# Patient Record
Sex: FEMALE | Race: OTHER | NOT HISPANIC OR LATINO | URBAN - METROPOLITAN AREA
[De-identification: names, ages, dates, MRNs, and addresses within clinical notes are randomized per-mention and may not be internally consistent; named-entity substitution may affect disease eponyms.]

---

## 2021-03-19 ENCOUNTER — EMERGENCY (EMERGENCY)
Facility: HOSPITAL | Age: 71
LOS: 1 days | Discharge: HOME | End: 2021-03-19
Attending: EMERGENCY MEDICINE | Admitting: EMERGENCY MEDICINE
Payer: COMMERCIAL

## 2021-03-19 VITALS
SYSTOLIC BLOOD PRESSURE: 168 MMHG | DIASTOLIC BLOOD PRESSURE: 94 MMHG | RESPIRATION RATE: 17 BRPM | HEART RATE: 80 BPM | OXYGEN SATURATION: 98 % | TEMPERATURE: 98 F

## 2021-03-19 VITALS
OXYGEN SATURATION: 99 % | TEMPERATURE: 98 F | DIASTOLIC BLOOD PRESSURE: 82 MMHG | SYSTOLIC BLOOD PRESSURE: 142 MMHG | RESPIRATION RATE: 18 BRPM | HEART RATE: 60 BPM

## 2021-03-19 DIAGNOSIS — I10 ESSENTIAL (PRIMARY) HYPERTENSION: ICD-10-CM

## 2021-03-19 DIAGNOSIS — Z20.822 CONTACT WITH AND (SUSPECTED) EXPOSURE TO COVID-19: ICD-10-CM

## 2021-03-19 DIAGNOSIS — R53.83 OTHER FATIGUE: ICD-10-CM

## 2021-03-19 DIAGNOSIS — Z88.0 ALLERGY STATUS TO PENICILLIN: ICD-10-CM

## 2021-03-19 DIAGNOSIS — E78.5 HYPERLIPIDEMIA, UNSPECIFIED: ICD-10-CM

## 2021-03-19 DIAGNOSIS — E11.9 TYPE 2 DIABETES MELLITUS WITHOUT COMPLICATIONS: ICD-10-CM

## 2021-03-19 DIAGNOSIS — R00.2 PALPITATIONS: ICD-10-CM

## 2021-03-19 LAB
ALBUMIN SERPL ELPH-MCNC: 4.6 G/DL — SIGNIFICANT CHANGE UP (ref 3.3–5)
ALP SERPL-CCNC: 87 U/L — SIGNIFICANT CHANGE UP (ref 40–120)
ALT FLD-CCNC: 23 U/L — SIGNIFICANT CHANGE UP (ref 10–45)
ANION GAP SERPL CALC-SCNC: 12 MMOL/L — SIGNIFICANT CHANGE UP (ref 5–17)
APTT BLD: 34.4 SEC — SIGNIFICANT CHANGE UP (ref 27.5–35.5)
AST SERPL-CCNC: 22 U/L — SIGNIFICANT CHANGE UP (ref 10–40)
BASOPHILS # BLD AUTO: 0.06 K/UL — SIGNIFICANT CHANGE UP (ref 0–0.2)
BASOPHILS NFR BLD AUTO: 0.7 % — SIGNIFICANT CHANGE UP (ref 0–2)
BILIRUB SERPL-MCNC: 0.3 MG/DL — SIGNIFICANT CHANGE UP (ref 0.2–1.2)
BUN SERPL-MCNC: 19 MG/DL — SIGNIFICANT CHANGE UP (ref 7–23)
CALCIUM SERPL-MCNC: 9.4 MG/DL — SIGNIFICANT CHANGE UP (ref 8.4–10.5)
CHLORIDE SERPL-SCNC: 104 MMOL/L — SIGNIFICANT CHANGE UP (ref 96–108)
CO2 SERPL-SCNC: 25 MMOL/L — SIGNIFICANT CHANGE UP (ref 22–31)
CREAT SERPL-MCNC: 0.6 MG/DL — SIGNIFICANT CHANGE UP (ref 0.5–1.3)
EOSINOPHIL # BLD AUTO: 0.35 K/UL — SIGNIFICANT CHANGE UP (ref 0–0.5)
EOSINOPHIL NFR BLD AUTO: 4 % — SIGNIFICANT CHANGE UP (ref 0–6)
GLUCOSE SERPL-MCNC: 102 MG/DL — HIGH (ref 70–99)
HCT VFR BLD CALC: 40.6 % — SIGNIFICANT CHANGE UP (ref 34.5–45)
HGB BLD-MCNC: 13.2 G/DL — SIGNIFICANT CHANGE UP (ref 11.5–15.5)
IMM GRANULOCYTES NFR BLD AUTO: 0.2 % — SIGNIFICANT CHANGE UP (ref 0–1.5)
INR BLD: 1.09 — SIGNIFICANT CHANGE UP (ref 0.88–1.16)
LYMPHOCYTES # BLD AUTO: 3.15 K/UL — SIGNIFICANT CHANGE UP (ref 1–3.3)
LYMPHOCYTES # BLD AUTO: 36.3 % — SIGNIFICANT CHANGE UP (ref 13–44)
MAGNESIUM SERPL-MCNC: 2.6 MG/DL — SIGNIFICANT CHANGE UP (ref 1.6–2.6)
MCHC RBC-ENTMCNC: 27.2 PG — SIGNIFICANT CHANGE UP (ref 27–34)
MCHC RBC-ENTMCNC: 32.5 GM/DL — SIGNIFICANT CHANGE UP (ref 32–36)
MCV RBC AUTO: 83.5 FL — SIGNIFICANT CHANGE UP (ref 80–100)
MONOCYTES # BLD AUTO: 0.77 K/UL — SIGNIFICANT CHANGE UP (ref 0–0.9)
MONOCYTES NFR BLD AUTO: 8.9 % — SIGNIFICANT CHANGE UP (ref 2–14)
NEUTROPHILS # BLD AUTO: 4.32 K/UL — SIGNIFICANT CHANGE UP (ref 1.8–7.4)
NEUTROPHILS NFR BLD AUTO: 49.9 % — SIGNIFICANT CHANGE UP (ref 43–77)
NRBC # BLD: 0 /100 WBCS — SIGNIFICANT CHANGE UP (ref 0–0)
PLATELET # BLD AUTO: 304 K/UL — SIGNIFICANT CHANGE UP (ref 150–400)
POTASSIUM SERPL-MCNC: 3.8 MMOL/L — SIGNIFICANT CHANGE UP (ref 3.5–5.3)
POTASSIUM SERPL-SCNC: 3.8 MMOL/L — SIGNIFICANT CHANGE UP (ref 3.5–5.3)
PROT SERPL-MCNC: 7.9 G/DL — SIGNIFICANT CHANGE UP (ref 6–8.3)
PROTHROM AB SERPL-ACNC: 13 SEC — SIGNIFICANT CHANGE UP (ref 10.6–13.6)
RBC # BLD: 4.86 M/UL — SIGNIFICANT CHANGE UP (ref 3.8–5.2)
RBC # FLD: 14.2 % — SIGNIFICANT CHANGE UP (ref 10.3–14.5)
SARS-COV-2 RNA SPEC QL NAA+PROBE: SIGNIFICANT CHANGE UP
SODIUM SERPL-SCNC: 141 MMOL/L — SIGNIFICANT CHANGE UP (ref 135–145)
TROPONIN T SERPL-MCNC: 0.01 NG/ML — SIGNIFICANT CHANGE UP (ref 0–0.01)
WBC # BLD: 8.67 K/UL — SIGNIFICANT CHANGE UP (ref 3.8–10.5)
WBC # FLD AUTO: 8.67 K/UL — SIGNIFICANT CHANGE UP (ref 3.8–10.5)

## 2021-03-19 PROCEDURE — 80053 COMPREHEN METABOLIC PANEL: CPT

## 2021-03-19 PROCEDURE — G1004: CPT

## 2021-03-19 PROCEDURE — U0003: CPT

## 2021-03-19 PROCEDURE — 71275 CT ANGIOGRAPHY CHEST: CPT

## 2021-03-19 PROCEDURE — 93010 ELECTROCARDIOGRAM REPORT: CPT | Mod: 59

## 2021-03-19 PROCEDURE — 99285 EMERGENCY DEPT VISIT HI MDM: CPT

## 2021-03-19 PROCEDURE — 83735 ASSAY OF MAGNESIUM: CPT

## 2021-03-19 PROCEDURE — 93005 ELECTROCARDIOGRAM TRACING: CPT

## 2021-03-19 PROCEDURE — 75574 CT ANGIO HRT W/3D IMAGE: CPT | Mod: 26,MG

## 2021-03-19 PROCEDURE — 75574 CT ANGIO HRT W/3D IMAGE: CPT

## 2021-03-19 PROCEDURE — 36415 COLL VENOUS BLD VENIPUNCTURE: CPT

## 2021-03-19 PROCEDURE — 85610 PROTHROMBIN TIME: CPT

## 2021-03-19 PROCEDURE — 84484 ASSAY OF TROPONIN QUANT: CPT

## 2021-03-19 PROCEDURE — U0005: CPT

## 2021-03-19 PROCEDURE — 71275 CT ANGIOGRAPHY CHEST: CPT | Mod: 26,MG

## 2021-03-19 PROCEDURE — 85730 THROMBOPLASTIN TIME PARTIAL: CPT

## 2021-03-19 PROCEDURE — 99284 EMERGENCY DEPT VISIT MOD MDM: CPT | Mod: 25

## 2021-03-19 PROCEDURE — 85025 COMPLETE CBC W/AUTO DIFF WBC: CPT

## 2021-03-19 NOTE — ED PROVIDER NOTE - NSFOLLOWUPINSTRUCTIONS_ED_ALL_ED_FT
Stay well hydrated.  Return for fevers, persistent vomit, uncontrolled pain, worsening breathing, worsening lightheaded.  Follow up with Dr. Morrison and Dr. Rocha. Call your doctor to schedule appointment.     Palpitations    A palpitation is the feeling that your heartbeat is irregular or is faster than normal. It may feel like your heart is fluttering or skipping a beat. They may be caused by many things, including smoking, caffeine, alcohol, stress, and certain medicines. Although most causes of palpitations are not serious, palpitations can be a sign of a serious medical problem. Avoid caffeine, alcohol, and tobacco products at home. Try to reduce stress and anxiety and make sure to get plenty of rest.     SEEK IMMEDIATE MEDICAL CARE IF YOU HAVE ANY OF THE FOLLOWING SYMPTOMS: chest pain, shortness of breath, severe headache, dizziness/lightheadedness, or fainting.

## 2021-03-19 NOTE — ED ADULT TRIAGE NOTE - CHIEF COMPLAINT QUOTE
pt c/o intermittent palpitations since february, worse yesterday and today. denies cp, sob. pt s/w her md and was told to go to ed for eval.

## 2021-03-19 NOTE — ED ADULT NURSE NOTE - OBJECTIVE STATEMENT
71yo female co intermittent palpitations while performing ADLs lasting about thirty minutes since February. Denies CP, SOB, N/V/D, headache, numbness/tingling, fevers/chills. Pt states she measured her HR with a monitor at home on initial episode and HR was in 120s. Endorses hx of htn.

## 2021-03-19 NOTE — ED PROVIDER NOTE - PATIENT PORTAL LINK FT
You can access the FollowMyHealth Patient Portal offered by [facility] by registering at the following website: [email or web address]. By joining [facility]’s FollowMyHealth portal, you will also be able to view your health information using other applications (apps) compatible with our system.

## 2021-03-19 NOTE — ED PROVIDER NOTE - CLINICAL SUMMARY MEDICAL DECISION MAKING FREE TEXT BOX
70F PMH borderline DM, HTN, HLD, p/w palpitations. Has ~1mo of sensation of heart beating fast, lasts ~30min. Increasing episodes. Called PMD Dr. Finn last night who referred to ED. Pt had episode this morning but is currently completely asymptomatic. During these episodes pt feels like her "brain is frozen," as well as b/l UE "tiredness." Mild HTN, other vitals wnl. Exam as above.   ddx: Palpitations x1mo. Low suspicion for ACS or significant arrythmia   labs, COVID, reassess.

## 2021-03-19 NOTE — ED PROVIDER NOTE - PROGRESS NOTE DETAILS
[place]: labs grossly wnl. remains asymptomatic. I d/w pmd dr. elise [phone]. He had touched base w/ dr. fontanez. I d/w dr. fontanez. requesting CT coronaries/PE eval. Updated pt. [place]: CTs w/ no acute pathology. Pt remains asymptomatic. Clinically no indication for further emergent ED workup or hospitalization at this time. Comfortable for dc, outpt f/u.  Updated dr. estee Easton will coordinate w/ dr. bailey outpt f/u.

## 2021-03-19 NOTE — ED PROVIDER NOTE - OBJECTIVE STATEMENT
70F PMH borderline DM, HTN, HLD, p/w palpitations. Has ~1mo of sensation of heart beating fast, lasts ~30min. Increasing episodes. Called PMD Dr. Finn last night who referred to ED. Pt had episode this morning but is currently completely asymptomatic. During these episodes pt feels like her "brain is frozen," as well as b/l UE "tiredness." Pt states that she cjecked her HR during one of these episodes and was 127  Denies associated CP, SOB, NVD, lightheaded, diaphoresis, cough/rhinorrhea, black/bloody stool, LE pain/swelling, other focal weakness/numbness, recent travel/immobilization, abd pain, urinary complaints, f/c. No hormone use. No FMH CAD/clots/sudden death. No known prior cardiac w/u. 70F PMH borderline DM, HTN, HLD, p/w palpitations. Has ~1mo of sensation of heart beating fast, lasts ~30min. Increasing episodes. Called PMD Dr. Finn last night who referred to ED. Pt had episode this morning but is currently completely asymptomatic. During these episodes pt feels like her "brain is frozen," as well as b/l UE "tiredness." Pt states that she checked her HR during one of these episodes and was 127  Denies associated CP, SOB, NVD, lightheaded, diaphoresis, cough/rhinorrhea, black/bloody stool, LE pain/swelling, other focal weakness/numbness, recent travel/immobilization, abd pain, urinary complaints, f/c. No hormone use. No FMH CAD/clots/sudden death. No known prior cardiac w/u.

## 2021-04-28 ENCOUNTER — NON-APPOINTMENT (OUTPATIENT)
Age: 71
End: 2021-04-28

## 2021-04-28 ENCOUNTER — APPOINTMENT (OUTPATIENT)
Dept: HEART AND VASCULAR | Facility: CLINIC | Age: 71
End: 2021-04-28
Payer: MEDICARE

## 2021-04-28 VITALS
TEMPERATURE: 97.4 F | WEIGHT: 149 LBS | BODY MASS INDEX: 30.04 KG/M2 | DIASTOLIC BLOOD PRESSURE: 79 MMHG | HEIGHT: 59 IN | HEART RATE: 85 BPM | SYSTOLIC BLOOD PRESSURE: 163 MMHG

## 2021-04-28 DIAGNOSIS — I10 ESSENTIAL (PRIMARY) HYPERTENSION: ICD-10-CM

## 2021-04-28 DIAGNOSIS — Z78.9 OTHER SPECIFIED HEALTH STATUS: ICD-10-CM

## 2021-04-28 DIAGNOSIS — I48.0 PAROXYSMAL ATRIAL FIBRILLATION: ICD-10-CM

## 2021-04-28 DIAGNOSIS — E78.5 HYPERLIPIDEMIA, UNSPECIFIED: ICD-10-CM

## 2021-04-28 PROCEDURE — 99205 OFFICE O/P NEW HI 60 MIN: CPT | Mod: 25

## 2021-04-28 PROCEDURE — 99072 ADDL SUPL MATRL&STAF TM PHE: CPT

## 2021-04-28 PROCEDURE — 93000 ELECTROCARDIOGRAM COMPLETE: CPT

## 2021-04-28 RX ORDER — LOSARTAN POTASSIUM 25 MG/1
25 TABLET, FILM COATED ORAL
Refills: 0 | Status: ACTIVE | COMMUNITY
Start: 2021-04-28

## 2021-04-28 RX ORDER — SIMVASTATIN 20 MG/1
20 TABLET, FILM COATED ORAL
Refills: 0 | Status: ACTIVE | COMMUNITY
Start: 2021-04-28

## 2021-05-03 NOTE — PHYSICAL EXAM
[Well Developed] : well developed [Well Nourished] : well nourished [Normal Conjunctiva] : normal conjunctiva [Normal] : normal venous pressure, no carotid bruit [Normal S1, S2] : normal S1, S2 [Clear Lung Fields] : clear lung fields [Good Air Entry] : good air entry [No Respiratory Distress] : no respiratory distress  [Soft] : abdomen soft [Normal Gait] : normal gait [No Edema] : no edema [No Rash] : no rash [Moves all extremities] : moves all extremities [Alert and Oriented] : alert and oriented

## 2021-05-03 NOTE — HISTORY OF PRESENT ILLNESS
[FreeTextEntry1] : 70 year old female with HTN, HLD and symptomatic paroxysmal atrial fibrillation, who presents for initial evaluation.\par \par She states that she has been having episodes of palpitations since 2018.  Initially she ignored them because she thought they were not important,  Now they occur about twice a month.  She wore an event monitor with afib rates up to 170 - no overall burden on monitor.  She is not on oral anticoagulation.  She notes palpitations but no chest pain, SOB, syncope, near syncope, edema.  \par

## 2021-05-03 NOTE — REVIEW OF SYSTEMS
[Palpitations] : palpitations [Negative] : Heme/Lymph [Fever] : no fever [Chills] : no chills [Feeling Fatigued] : not feeling fatigued [SOB] : no shortness of breath [Syncope] : no syncope

## 2021-05-03 NOTE — DISCUSSION/SUMMARY
[FreeTextEntry1] : 70 year old female with HTN, HLD and symptomatic paroxysmal atrial fibrillation, who presents for initial evaluation.  We discussed what atrial fibrillation is, the natural progression of this arrhythmia and the association with stroke.  Her CHADSVASC score is at least 3 and I have asked her to start ELiquis.  WE discussed treatment options for paroxysmal atrial fibrillation including rate control / observation, antiarrhythmic medications or an ablation.  We discussed the procedure in detail including risks, benefits and alternatives.  She would like to think about this before deciding how she wants to proceed.  I have asked her to get an echocardiogram to assure she ha a structurally normal heart and a CT scan to define her anatomy incase she decides to proceed with an ablation. I have also asked her to start low dose Metoprolol to see if this helps with her symptoms.   She will follow up after these tests or sooner if needed and knows to call with any questions or concerns.

## 2021-05-26 ENCOUNTER — FORM ENCOUNTER (OUTPATIENT)
Age: 71
End: 2021-05-26

## 2021-05-27 ENCOUNTER — APPOINTMENT (OUTPATIENT)
Dept: CT IMAGING | Facility: HOSPITAL | Age: 71
End: 2021-05-27

## 2021-05-27 ENCOUNTER — OUTPATIENT (OUTPATIENT)
Dept: OUTPATIENT SERVICES | Facility: HOSPITAL | Age: 71
LOS: 1 days | End: 2021-05-27
Payer: MEDICARE

## 2021-05-27 DIAGNOSIS — I48.0 PAROXYSMAL ATRIAL FIBRILLATION: ICD-10-CM

## 2021-05-27 PROCEDURE — 93306 TTE W/DOPPLER COMPLETE: CPT

## 2021-05-27 PROCEDURE — 76376 3D RENDER W/INTRP POSTPROCES: CPT | Mod: 26

## 2021-05-27 PROCEDURE — 93306 TTE W/DOPPLER COMPLETE: CPT | Mod: 26

## 2021-06-10 ENCOUNTER — NON-APPOINTMENT (OUTPATIENT)
Age: 71
End: 2021-06-10

## 2021-06-10 ENCOUNTER — APPOINTMENT (OUTPATIENT)
Dept: HEART AND VASCULAR | Facility: CLINIC | Age: 71
End: 2021-06-10
Payer: MEDICARE

## 2021-06-10 VITALS
OXYGEN SATURATION: 96 % | WEIGHT: 151 LBS | SYSTOLIC BLOOD PRESSURE: 129 MMHG | HEART RATE: 78 BPM | DIASTOLIC BLOOD PRESSURE: 76 MMHG | TEMPERATURE: 97.2 F | HEIGHT: 59 IN | BODY MASS INDEX: 30.44 KG/M2

## 2021-06-10 PROCEDURE — 93000 ELECTROCARDIOGRAM COMPLETE: CPT

## 2021-06-10 PROCEDURE — 99072 ADDL SUPL MATRL&STAF TM PHE: CPT

## 2021-06-10 PROCEDURE — 99214 OFFICE O/P EST MOD 30 MIN: CPT

## 2021-06-14 NOTE — REVIEW OF SYSTEMS
[Palpitations] : palpitations [Negative] : Heme/Lymph [Fever] : no fever [Weight Gain (___ Lbs)] : no recent weight gain [Chills] : no chills [Feeling Fatigued] : not feeling fatigued [Weight Loss (___ Lbs)] : no recent weight loss [SOB] : no shortness of breath [Syncope] : no syncope [Rash] : no rash

## 2021-06-14 NOTE — DISCUSSION/SUMMARY
[FreeTextEntry1] : 70 year old female with HTN, HLD and symptomatic paroxysmal atrial fibrillation, who presents for follow up.  We again discussed what atrial fibrillation is, the natural progression of this arrhythmia and the association with stroke.  Her CHADSVASC score is at least 3 and she notes compliance with Eliquis.  She notes continued symptoms from her paroxysmal afib.  Structurally normal heart on echo.  We again discussed options of rate control / observation, antiarrhythmic medications or an ablation.  We discussed the procedure in detail including risks, benefits and alternatives.  She did not note am improvement with starting Metoprolol.  I have told her she is a good candidate for an ablation - however, she is not ready to proceed.  She will follow up with her primary cardiologist to discuss this further and knows to call with any questions or concerns.

## 2021-06-14 NOTE — HISTORY OF PRESENT ILLNESS
[FreeTextEntry1] : 70 year old female with HTN, HLD and symptomatic paroxysmal atrial fibrillation, who presents for follow up.\par \par She states that she has been having episodes of palpitations since 2018.  Initially she ignored them because she thought they were not important,  Now they occur about twice a month.  She wore an event monitor with afib rates up to 170 - no overall burden on monitor.  She was seen in our office and started on oral anticoagulation.  She continues to note palpitations but no chest pain, SOB, syncope, near syncope, edema.  \par

## 2021-06-14 NOTE — CARDIOLOGY SUMMARY
[de-identified] : 4/28/21 sinus at 77bpm\par 6/10/21 sinus at 80bpm [de-identified] : 1. Normal left and right ventricular size and systolic function. \par  2. Indeterminate left ventricular diastolic function. \par  3. No significant valvular disease. \par  4. No evidence of pulmonary hypertension, pulmonary artery systolic pressure is 18 m\par  5. Trivial pericardial effusion. \par  6. No prior echo is available for comparison.

## 2021-06-14 NOTE — PHYSICAL EXAM
[Well Developed] : well developed [Well Nourished] : well nourished [Normal Conjunctiva] : normal conjunctiva [Normal] : normal venous pressure, no carotid bruit [Normal S1, S2] : normal S1, S2 [Clear Lung Fields] : clear lung fields [Good Air Entry] : good air entry [No Respiratory Distress] : no respiratory distress  [Soft] : abdomen soft [Normal Gait] : normal gait [No Edema] : no edema [No Rash] : no rash [Moves all extremities] : moves all extremities [Alert and Oriented] : alert and oriented [No Acute Distress] : no acute distress [No Murmur] : no murmur

## 2021-10-04 DIAGNOSIS — I67.1 CEREBRAL ANEURYSM, NONRUPTURED: ICD-10-CM

## 2021-10-05 ENCOUNTER — APPOINTMENT (OUTPATIENT)
Dept: NEUROSURGERY | Facility: CLINIC | Age: 71
End: 2021-10-05
Payer: MEDICARE

## 2021-10-05 VITALS
SYSTOLIC BLOOD PRESSURE: 148 MMHG | BODY MASS INDEX: 30.44 KG/M2 | OXYGEN SATURATION: 98 % | TEMPERATURE: 97 F | WEIGHT: 151 LBS | RESPIRATION RATE: 18 BRPM | HEIGHT: 59 IN | HEART RATE: 71 BPM | DIASTOLIC BLOOD PRESSURE: 71 MMHG

## 2021-10-05 PROCEDURE — 99205 OFFICE O/P NEW HI 60 MIN: CPT

## 2021-10-06 NOTE — ASSESSMENT
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 I explained the indications for observation and imaging surveillance, medical management endovascular therapies and surgery. I discussed the risks, benefits, possible complications and expected outcome related to each treatment option. In the end, I recommend repeating MRA brain to check progression of her A2 segment aneurysm and will have her follow up in the office in 2 weeks time for imaging review and possible need for further workup. We discussed possible referral to neurology on follow up for her episodes of numbness and heaviness of head, arms and legs.  The patient understands the plan of care and is in agreement.  All questions answered to patient satisfaction.\par \par

## 2021-10-06 NOTE — PHYSICAL EXAM
[General Appearance - Alert] : alert [General Appearance - In No Acute Distress] : in no acute distress [Oriented To Time, Place, And Person] : oriented to person, place, and time [Impaired Insight] : insight and judgment were intact [Affect] : the affect was normal [Person] : oriented to person [Place] : oriented to place [Time] : oriented to time [Short Term Intact] : short term memory intact [Remote Intact] : remote memory intact [Span Intact] : the attention span was normal [Concentration Intact] : normal concentrating ability [Fluency] : fluency intact [Comprehension] : comprehension intact [Current Events] : adequate knowledge of current events [Past History] : adequate knowledge of personal past history [Vocabulary] : adequate range of vocabulary [Cranial Nerves Optic (II)] : visual acuity intact bilaterally,  pupils equal round and reactive to light [Cranial Nerves Oculomotor (III)] : extraocular motion intact [Cranial Nerves Trigeminal (V)] : facial sensation intact symmetrically [Cranial Nerves Facial (VII)] : face symmetrical [Cranial Nerves Vestibulocochlear (VIII)] : hearing was intact bilaterally [Cranial Nerves Glossopharyngeal (IX)] : tongue and palate midline [Cranial Nerves Accessory (XI - Cranial And Spinal)] : head turning and shoulder shrug symmetric [Cranial Nerves Hypoglossal (XII)] : there was no tongue deviation with protrusion [Motor Tone] : muscle tone was normal in all four extremities [Motor Strength] : muscle strength was normal in all four extremities [No Muscle Atrophy] : normal bulk in all four extremities [Abnormal Walk] : normal gait [Sensation Tactile Decrease] : light touch was intact [Balance] : balance was intact [2+] : Patella left 2+ [Past-pointing] : there was no past-pointing [Tremor] : no tremor present

## 2021-10-06 NOTE — HISTORY OF PRESENT ILLNESS
[de-identified] : ALEJANDRO SAUCEDA is a 70 year female with a PMH of HTN, HLD, Paroxysmal Afib diagnosed in February, currently on eliquis, who presents to the office today for neuroendovascular consultation due to 2mm A2 segment of KRISTA aneurysm last imaged in 2018.  The patient complains of 2-3 episodes per week of 30-45 mins of lightheadedness, numbness of the entire head and tingling/heaviness of bilateral arms. Sometimes she has nausea associated with these episodes. The patient reports no current headaches, nausea, vomiting, weakness, numbness, or visual changes.  She has never smoked. and has no family history of aneurysms.  \par

## 2021-11-27 PROBLEM — R20.0 NUMBNESS OF ARM: Status: ACTIVE | Noted: 2021-11-27

## 2021-11-30 ENCOUNTER — APPOINTMENT (OUTPATIENT)
Dept: NEUROSURGERY | Facility: CLINIC | Age: 71
End: 2021-11-30
Payer: MEDICARE

## 2021-11-30 DIAGNOSIS — R20.0 ANESTHESIA OF SKIN: ICD-10-CM

## 2021-12-07 ENCOUNTER — APPOINTMENT (OUTPATIENT)
Dept: NEUROSURGERY | Facility: CLINIC | Age: 71
End: 2021-12-07
Payer: MEDICARE

## 2022-01-04 ENCOUNTER — APPOINTMENT (OUTPATIENT)
Dept: NEUROSURGERY | Facility: CLINIC | Age: 72
End: 2022-01-04
Payer: MEDICARE

## 2022-01-06 DIAGNOSIS — Z01.818 ENCOUNTER FOR OTHER PREPROCEDURAL EXAMINATION: ICD-10-CM

## 2022-01-07 ENCOUNTER — APPOINTMENT (OUTPATIENT)
Dept: NEUROSURGERY | Facility: CLINIC | Age: 72
End: 2022-01-07
Payer: MEDICARE

## 2022-01-07 PROCEDURE — 99215 OFFICE O/P EST HI 40 MIN: CPT | Mod: 95

## 2022-01-14 NOTE — REASON FOR VISIT
[FreeTextEntry1] : The patient has given verbal consent for this telehealth visit using two-way audio and video technology.  The patient is currently located at home [address]\[place] [address], and I am located at my office at [facility].\par \par \par Ms. Sauceda returns today for interval followup and imaging review of MRA brain, which was performed to evaluate her known 2mm KRISTA A2 segment unruptured, untreated cerebral aneurysm.  The patient reports no current headaches, nausea, vomiting, weakness, numbness, or visual changes.\par \par ALEJANDRO SAUCEDA is a 70 year female with a PMH of HTN, HLD, Paroxysmal Afib diagnosed in February, currently on eliquis, who presents to the office today for neuroendovascular consultation due to 2mm A2 segment of KRISTA aneurysm last imaged in 2018. The patient complains of 2-3 episodes per week of 30-45 mins of lightheadedness, numbness of the entire head and tingling/heaviness of bilateral arms. Sometimes she has nausea associated with these episodes. The patient reports no current headaches, nausea, vomiting, weakness, numbness, or visual changes. She has never smoked. and has no family history of aneurysms.

## 2022-01-14 NOTE — ASSESSMENT
[FreeTextEntry1] : Ms. Fields is a 70 year old with HTN, HLD, paroxysmal afib on eliquis who presents for follow up of her known right A2 segment 2mm aneurysm, last imaged in 2018.\par \par Ms. Fields's KRISTA aneurysm has grown slightly in size and morphology is slightly changed on her recent MRA as compared with her last imaging from 2018. I have discussed the natural history and treatment options for KRISTA aneurysms with the patient. I explained the indications for observation and imaging surveillance, medical management endovascular therapies and surgery. I discussed the risks, benefits, possible complications and expected outcome related to each treatment option. In the end,  I advised that she undergo a diagnostic cerebral angiogram to further evaluate her aneurysm.  I alternatively discussed that she could do follow up imaging in the form of MRA brain in 6 month's time to assess for further change.  In the end, she has decided to think about her options and we touch base with her next week once she has made a decision.  We discussed possible referral to neurology on follow up for her episodes of numbness and heaviness of head, arms and legs. The patient understands the plan of care and is in agreement. All questions answered to patient satisfaction.

## 2022-01-21 ENCOUNTER — APPOINTMENT (OUTPATIENT)
Dept: NEUROSURGERY | Facility: CLINIC | Age: 72
End: 2022-01-21

## 2022-01-26 NOTE — ASSESSMENT
[FreeTextEntry1] : Ms. Fields is a 70 year old with HTN, HLD, paroxysmal afib on eliquis who presents for follow up of her known right A2 segment 2mm aneurysm, last imaged in 2018.\par \par Ms. Fields's KRISTA aneurysm has grown slightly in size and morphology is slightly changed on her recent MRA as compared with her last imaging from 2018. I have discussed the natural history and treatment options for KRISTA aneurysms with the patient. I explained the indications for observation and imaging surveillance, medical management endovascular therapies and surgery. I discussed the risks, benefits, possible complications and expected outcome related to each treatment option. In the end, I advised that she undergo a diagnostic cerebral angiogram to further evaluate her aneurysm. I alternatively discussed that she could do follow up imaging in the form of MRA brain in 6 month's time to assess for further change. In the end, she has decided ***. We discussed possible referral to neurology on follow up for her episodes of numbness and heaviness of head, arms and legs. The patient understands the plan of care and is in agreement. All questions answered to patient satisfaction. \par

## 2022-01-28 ENCOUNTER — APPOINTMENT (OUTPATIENT)
Dept: NEUROSURGERY | Facility: CLINIC | Age: 72
End: 2022-01-28

## 2022-03-28 ENCOUNTER — RX RENEWAL (OUTPATIENT)
Age: 72
End: 2022-03-28

## 2022-05-01 ENCOUNTER — RX RENEWAL (OUTPATIENT)
Age: 72
End: 2022-05-01

## 2022-05-01 RX ORDER — APIXABAN 5 MG/1
5 TABLET, FILM COATED ORAL
Qty: 60 | Refills: 3 | Status: ACTIVE | COMMUNITY
Start: 2021-04-28 | End: 1900-01-01

## 2022-06-18 ENCOUNTER — NON-APPOINTMENT (OUTPATIENT)
Age: 72
End: 2022-06-18

## 2023-01-09 ENCOUNTER — RX RENEWAL (OUTPATIENT)
Age: 73
End: 2023-01-09

## 2023-06-26 ENCOUNTER — RX RENEWAL (OUTPATIENT)
Age: 73
End: 2023-06-26

## 2023-11-06 ENCOUNTER — RX RENEWAL (OUTPATIENT)
Age: 73
End: 2023-11-06

## 2023-11-13 ENCOUNTER — RX RENEWAL (OUTPATIENT)
Age: 73
End: 2023-11-13

## 2023-11-13 RX ORDER — METOPROLOL SUCCINATE 25 MG/1
25 TABLET, EXTENDED RELEASE ORAL
Qty: 30 | Refills: 0 | Status: ACTIVE | COMMUNITY
Start: 2021-04-28 | End: 1900-01-01

## 2025-04-07 ENCOUNTER — OFFICE (OUTPATIENT)
Facility: LOCATION | Age: 75
Setting detail: OPHTHALMOLOGY
End: 2025-04-07
Payer: MEDICARE

## 2025-04-07 ENCOUNTER — RX ONLY (RX ONLY)
Age: 75
End: 2025-04-07

## 2025-04-07 DIAGNOSIS — H25.13: ICD-10-CM

## 2025-04-07 DIAGNOSIS — E11.9: ICD-10-CM

## 2025-04-07 DIAGNOSIS — H43.393: ICD-10-CM

## 2025-04-07 DIAGNOSIS — H25.11: ICD-10-CM

## 2025-04-07 DIAGNOSIS — H16.223: ICD-10-CM

## 2025-04-07 PROCEDURE — 99204 OFFICE O/P NEW MOD 45 MIN: CPT | Performed by: OPHTHALMOLOGY

## 2025-04-07 PROCEDURE — 92136 OPHTHALMIC BIOMETRY: CPT | Mod: RT | Performed by: OPHTHALMOLOGY

## 2025-04-07 PROCEDURE — 92202 OPSCPY EXTND ON/MAC DRAW: CPT | Performed by: OPHTHALMOLOGY

## 2025-04-07 ASSESSMENT — REFRACTION_CURRENTRX
OS_SPHERE: +2.50
OS_AXIS: 085
OD_OVR_VA: 20/
OD_ADD: +2.25
OD_CYLINDER: -0.50
OD_SPHERE: +2.25
OS_ADD: +2.25
OS_OVR_VA: 20/
OS_CYLINDER: -0.25
OD_AXIS: 063

## 2025-04-07 ASSESSMENT — REFRACTION_AUTOREFRACTION
OS_CYLINDER: -0.75
OS_SPHERE: +2.75
OD_SPHERE: +3.00
OD_CYLINDER: -1.50
OD_AXIS: 080
OS_AXIS: 075

## 2025-04-07 ASSESSMENT — KERATOMETRY
OD_K1POWER_DIOPTERS: 43.75
OS_CYLPOWER_DEGREES: 1.25
OD_AXISANGLE_DEGREES: 110
OD_K2POWER_DIOPTERS: 44.25
OS_AXISANGLE2_DEGREES: 095
OS_K1K2_AVERAGE: 43.875
OD_AXISANGLE_DEGREES: 110
OS_K2POWER_DIOPTERS: 44.50
OD_CYLAXISANGLE_DEGREES: 110
OS_K1POWER_DIOPTERS: 43.25
OD_CYLPOWER_DEGREES: 0.5
OD_K1POWER_DIOPTERS: 43.75
OS_CYLAXISANGLE_DEGREES: 95
OD_K1K2_AVERAGE: 44
OD_AXISANGLE2_DEGREES: 110
OS_AXISANGLE_DEGREES: 095
OS_AXISANGLE_DEGREES: 095
OS_K1POWER_DIOPTERS: 43.25
OS_K2POWER_DIOPTERS: 44.50
OD_K2POWER_DIOPTERS: 44.25

## 2025-04-07 ASSESSMENT — PACHYMETRY
OS_CT_UM: 542
OS_CT_CORRECTION: 0
OD_CT_CORRECTION: -4
OD_CT_UM: 590

## 2025-04-07 ASSESSMENT — REFRACTION_MANIFEST
OD_AXIS: 090
OS_VA1: 20/20
OD_VA1: 20/40-2
OD_CYLINDER: -1.50
OS_CYLINDER: -0.75
OS_VA1: 20/20
OD_SPHERE: +3.00
OD_AXIS: 080
OD_VA1: 20/40-2
OS_SPHERE: +2.75
OS_SPHERE: +2.75
OS_CYLINDER: -0.75
OD_CYLINDER: -1.00
OD_SPHERE: +2.75
OS_AXIS: 090
OS_AXIS: 075

## 2025-04-07 ASSESSMENT — TONOMETRY
OS_IOP_MMHG: 12
OD_IOP_MMHG: 12

## 2025-04-07 ASSESSMENT — VISUAL ACUITY
OD_BCVA: 20/50-2
OS_BCVA: 20/50

## 2025-04-07 ASSESSMENT — CONFRONTATIONAL VISUAL FIELD TEST (CVF)
OD_FINDINGS: FULL
OS_FINDINGS: FULL

## 2025-04-07 ASSESSMENT — TEAR BREAK UP TIME (TBUT)
OD_TBUT: 1+
OS_TBUT: 1+